# Patient Record
Sex: MALE | Race: OTHER
[De-identification: names, ages, dates, MRNs, and addresses within clinical notes are randomized per-mention and may not be internally consistent; named-entity substitution may affect disease eponyms.]

---

## 2020-06-26 ENCOUNTER — HOSPITAL ENCOUNTER (OUTPATIENT)
Dept: HOSPITAL 47 - RADXRMAIN | Age: 60
Discharge: HOME | End: 2020-06-26
Attending: UROLOGY
Payer: COMMERCIAL

## 2020-06-26 DIAGNOSIS — N13.30: Primary | ICD-10-CM

## 2020-06-26 DIAGNOSIS — R31.9: ICD-10-CM

## 2020-06-26 PROCEDURE — 74018 RADEX ABDOMEN 1 VIEW: CPT

## 2020-06-26 NOTE — XR
EXAMINATION TYPE: XR KUB

 

DATE OF EXAM: 6/26/2020

 

COMPARISON: NONE

 

HISTORY: Pain

 

TECHNIQUE: One view abdominal series

 

FINDINGS:  

The osseous structures are intact.  The bowel gas pattern is nonspecific. Bilateral hip arthropathy. 
Degenerative change of the spine. Correlate for femoral acetabular impingement. Sclerosis of the pubi
c symphysis.

 

No definite calcifications overlying either renal outline.

There are 2 calcifications in the lower pelvis measuring 3.5 mm in diameter.

 

IMPRESSION:  

1.  Nonspecific abdomen. Left hemipelvic calcifications measuring approximately 3.5 mm in diameter.

2. No definite calcifications overlying either renal outline.